# Patient Record
Sex: FEMALE
[De-identification: names, ages, dates, MRNs, and addresses within clinical notes are randomized per-mention and may not be internally consistent; named-entity substitution may affect disease eponyms.]

---

## 2018-10-21 ENCOUNTER — HOSPITAL ENCOUNTER (INPATIENT)
Dept: HOSPITAL 92 - L&D | Age: 33
LOS: 3 days | Discharge: HOME | End: 2018-10-24
Attending: OBSTETRICS & GYNECOLOGY | Admitting: OBSTETRICS & GYNECOLOGY
Payer: MEDICAID

## 2018-10-21 VITALS — BODY MASS INDEX: 27.6 KG/M2

## 2018-10-21 DIAGNOSIS — K83.1: ICD-10-CM

## 2018-10-21 DIAGNOSIS — Z3A.36: ICD-10-CM

## 2018-10-21 DIAGNOSIS — O40.3XX0: ICD-10-CM

## 2018-10-21 LAB
ALBUMIN SERPL BCG-MCNC: 3.3 G/DL (ref 3.5–5)
ALP SERPL-CCNC: 173 U/L (ref 40–150)
ALT SERPL W P-5'-P-CCNC: 22 U/L (ref 8–55)
ANION GAP SERPL CALC-SCNC: 12 MMOL/L (ref 10–20)
AST SERPL-CCNC: 17 U/L (ref 5–34)
BILIRUB SERPL-MCNC: 0.7 MG/DL (ref 0.2–1.2)
BUN SERPL-MCNC: 14 MG/DL (ref 7–18.7)
CALCIUM SERPL-MCNC: 8.3 MG/DL (ref 7.8–10.44)
CHLORIDE SERPL-SCNC: 106 MMOL/L (ref 98–107)
CO2 SERPL-SCNC: 22 MMOL/L (ref 22–29)
CREAT CL PREDICTED SERPL C-G-VRATE: 0 ML/MIN (ref 70–130)
GLOBULIN SER CALC-MCNC: 2.6 G/DL (ref 2.4–3.5)
GLUCOSE SERPL-MCNC: 114 MG/DL (ref 70–105)
HBSAG INDEX: 0.22 S/CO (ref 0–0.99)
HGB BLD-MCNC: 8.6 G/DL (ref 12–16)
MCH RBC QN AUTO: 23.2 PG (ref 27–31)
MCV RBC AUTO: 71.1 FL (ref 78–98)
PLATELET # BLD AUTO: 307 THOU/UL (ref 130–400)
POTASSIUM SERPL-SCNC: 4.1 MMOL/L (ref 3.5–5.1)
RBC # BLD AUTO: 3.7 MILL/UL (ref 4.2–5.4)
SODIUM SERPL-SCNC: 136 MMOL/L (ref 136–145)
SYPHILIS ANTIBODY INDEX: 0.02 S/CO
WBC # BLD AUTO: 9.2 THOU/UL (ref 4.8–10.8)

## 2018-10-21 PROCEDURE — 86850 RBC ANTIBODY SCREEN: CPT

## 2018-10-21 PROCEDURE — 36416 COLLJ CAPILLARY BLOOD SPEC: CPT

## 2018-10-21 PROCEDURE — 85027 COMPLETE CBC AUTOMATED: CPT

## 2018-10-21 PROCEDURE — 36415 COLL VENOUS BLD VENIPUNCTURE: CPT

## 2018-10-21 PROCEDURE — 86901 BLOOD TYPING SEROLOGIC RH(D): CPT

## 2018-10-21 PROCEDURE — 88307 TISSUE EXAM BY PATHOLOGIST: CPT

## 2018-10-21 PROCEDURE — 51702 INSERT TEMP BLADDER CATH: CPT

## 2018-10-21 PROCEDURE — 87340 HEPATITIS B SURFACE AG IA: CPT

## 2018-10-21 PROCEDURE — 86900 BLOOD TYPING SEROLOGIC ABO: CPT

## 2018-10-21 PROCEDURE — 86780 TREPONEMA PALLIDUM: CPT

## 2018-10-21 PROCEDURE — 80053 COMPREHEN METABOLIC PANEL: CPT

## 2018-10-21 RX ADMIN — CLINDAMYCIN PHOSPHATE SCH: 900 INJECTION, SOLUTION INTRAVENOUS at 22:14

## 2018-10-21 RX ADMIN — SODIUM CHLORIDE SCH: 0.9 INJECTION, SOLUTION INTRAVENOUS at 22:47

## 2018-10-22 PROCEDURE — 3E033VJ INTRODUCTION OF OTHER HORMONE INTO PERIPHERAL VEIN, PERCUTANEOUS APPROACH: ICD-10-PCS | Performed by: ADVANCED PRACTICE MIDWIFE

## 2018-10-22 PROCEDURE — 0HQ9XZZ REPAIR PERINEUM SKIN, EXTERNAL APPROACH: ICD-10-PCS | Performed by: ADVANCED PRACTICE MIDWIFE

## 2018-10-22 RX ADMIN — CLINDAMYCIN PHOSPHATE SCH MLS: 900 INJECTION, SOLUTION INTRAVENOUS at 07:39

## 2018-10-22 RX ADMIN — CLINDAMYCIN PHOSPHATE SCH: 900 INJECTION, SOLUTION INTRAVENOUS at 16:53

## 2018-10-22 RX ADMIN — DOCUSATE CALCIUM SCH MG: 240 CAPSULE, LIQUID FILLED ORAL at 21:39

## 2018-10-22 RX ADMIN — SODIUM CHLORIDE SCH MLS: 0.9 INJECTION, SOLUTION INTRAVENOUS at 07:42

## 2018-10-22 NOTE — PDOC.OPDEL
OB Operative/Delivery Note


Delivery Dr/Surgeon: CHELY Hoskins CNM


Pre-Delivery Diagnosis: medically indicated induction, other (cholestasis GDM 

a1 polyhydramnios)


Weeks gestation: 36


Anesthesia: epidural





- Findings


  ** A


Sex: female





- Additional Findings/Plan


Placenta delivered: spontaneous


Repaired Obstetrical Laceration: 1st degree


Estimated blood loss: 500ml 


Compilations/Other Findings: 





GBS treated with Clindamycin - which showed resistance after sensitivity 

results were returned. 


Post delivery plan: routine recovery

## 2018-10-23 LAB
HGB BLD-MCNC: 7.8 G/DL (ref 12–16)
MCH RBC QN AUTO: 22.1 PG (ref 27–31)
MCV RBC AUTO: 72 FL (ref 78–98)
PLATELET # BLD AUTO: 263 THOU/UL (ref 130–400)
RBC # BLD AUTO: 3.53 MILL/UL (ref 4.2–5.4)
WBC # BLD AUTO: 14.8 THOU/UL (ref 4.8–10.8)

## 2018-10-23 RX ADMIN — DOCUSATE CALCIUM SCH MG: 240 CAPSULE, LIQUID FILLED ORAL at 21:22

## 2018-10-23 RX ADMIN — DOCUSATE CALCIUM SCH MG: 240 CAPSULE, LIQUID FILLED ORAL at 09:15

## 2018-10-23 NOTE — PDOC.PP
Post Partum Progress Note


Post Partum Day #: 1


Subjective: 





doing well but still itching. passing gas. urinating without difficultly


PO intake tolerated: yes


Flatus: yes


Ambulation: yes


 Vital Signs (12 hours)











  Temp Pulse Resp BP BP Pulse Ox


 


 10/23/18 07:40  97.8 F  75  16  125/58 L   100


 


 10/23/18 05:00  97.5 F L  72  20   132/76 


 


 10/23/18 00:00  97.5 F L  88  20  114/53 L  








 Weight











Weight                         146 lb

















- Physical Examination


Cardiovascular: no m/r/g, RRR


Respiratory: clear to auscultation bilaterally, non-labored breathing


Abdominal: + bowel sounds, lochia (moderate)


Fundus firm & at: 0


Extremities: negative homans (B)


Skin: no rash


Neurological: no gross focal deficits


Psychiatric: A&Ox3, normal affect


Result Diagrams: 


 10/23/18 07:16





 10/21/18 21:58


Additional Labs: 


 Post Partum Labs











Blood Type  O POSITIVE   10/21/18  21:58    


 


Hep Bs Antigen  Non-Reactive S/CO (NonReactive)   10/21/18  21:58    











(1)  (spontaneous vaginal delivery)


Code(s): O80 - ENCOUNTER FOR FULL-TERM UNCOMPLICATED DELIVERY   Status: Acute   





(2) Cholestasis during pregnancy


Code(s): O26.619 - LIVER AND BILIARY TRACT DISORD IN PREGNANCY, UNSP TRIMESTER; 

K83.1 - OBSTRUCTION OF BILE DUCT   Status: Acute   





(3) Gestational diabetes


Code(s): O24.419 - GESTATIONAL DIABETES MELLITUS IN PREGNANCY, UNSP CONTROL   

Status: Acute   





(4) Advanced maternal age (AMA) in pregnancy


Code(s): FIC0780 -    Status: Acute   





(5) Polyhydramnios


Code(s): O40.9XX0 - POLYHYDRAMNIOS, UNSP TRIMESTER, NOT APPLICABLE OR UNSP   

Status: Acute   





(6) Group beta Strep positive


Code(s): B95.1 - STREPTOCOCCUS, GROUP B, CAUSING DISEASES CLASSD ELSWHR   Status

: Acute   





(7) Anemia


Code(s): D64.9 - ANEMIA, UNSPECIFIED   Status: Acute   





- Assessment/Plan





A: G6 now P4  following medically indicated IOL for cholestasis


P:  


Start urosiodol for itching.


routine postpartum care


discharge home tomorrow


iron for anemia

## 2018-10-24 VITALS — TEMPERATURE: 97.5 F | DIASTOLIC BLOOD PRESSURE: 66 MMHG | SYSTOLIC BLOOD PRESSURE: 122 MMHG

## 2018-10-24 RX ADMIN — DOCUSATE CALCIUM SCH MG: 240 CAPSULE, LIQUID FILLED ORAL at 09:05

## 2018-10-24 NOTE — PDOC.PP
Post Partum Progress Note


Post Partum Day #: PPD2


Subjective: 


Doing well, no complaints.


PO intake tolerated: yes


Ambulation: yes


 Vital Signs (12 hours)











  Temp Pulse Resp BP Pulse Ox


 


 10/23/18 21:00  98.3 F  96  18  126/61  98








 Weight











Weight                         66.224 kg

















- Physical Examination


General: NAD


Respiratory: non-labored breathing


Psychiatric: normal affect


Result Diagrams: 


 10/23/18 07:16





 10/21/18 21:58


Additional Labs: 


 Post Partum Labs











Blood Type  O POSITIVE   10/21/18  21:58    


 


Hep Bs Antigen  Non-Reactive S/CO (NonReactive)   10/21/18  21:58    














- Assessment/Plan


DC home.


Precautions.


F/u Alexandra Light in 6 weeks.